# Patient Record
Sex: FEMALE | URBAN - METROPOLITAN AREA
[De-identification: names, ages, dates, MRNs, and addresses within clinical notes are randomized per-mention and may not be internally consistent; named-entity substitution may affect disease eponyms.]

---

## 2023-07-06 ENCOUNTER — APPOINTMENT (OUTPATIENT)
Dept: URBAN - METROPOLITAN AREA CLINIC 305 | Age: 68
Setting detail: DERMATOLOGY
End: 2023-07-06

## 2023-07-06 PROBLEM — C44.319 BASAL CELL CARCINOMA OF SKIN OF OTHER PARTS OF FACE: Status: ACTIVE | Noted: 2023-07-06

## 2023-07-06 PROCEDURE — OTHER ADDITIONAL NOTES: OTHER

## 2023-07-06 PROCEDURE — 99214 OFFICE O/P EST MOD 30 MIN: CPT

## 2023-07-06 NOTE — PROCEDURE: ADDITIONAL NOTES
Patient Management Risk Assessment: Moderate
Render Risk Assessment In Note?: no
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 Two biopsies were performed however, I feel that this will likely coalesce into one large lesion including removal of the old skin graft. Given that this will be a potentially large, multi subunit defect, I have recommended Mohs surgery for the excision and coordination with plastic surgery for reconstruction.\\n\\nWe will plan to also treat the right cheek at the same visit. However, I would anticipate a simple primary closure for this defect.
Detail Level: Simple

## 2023-07-06 NOTE — HPI: MOHS SURGERY CONSULTATION
Has The Cancer Been Biopsied Before?: has been previously biopsied
Who Is Your Referring Provider?: Pippa Randall
When Was Your Biopsy?: 6/13/23

## 2023-09-14 ENCOUNTER — APPOINTMENT (OUTPATIENT)
Dept: URBAN - METROPOLITAN AREA CLINIC 305 | Age: 68
Setting detail: DERMATOLOGY
End: 2023-09-14

## 2023-09-14 PROBLEM — C44.311 BASAL CELL CARCINOMA OF SKIN OF NOSE: Status: ACTIVE | Noted: 2023-09-14

## 2023-09-14 PROBLEM — C44.319 BASAL CELL CARCINOMA OF SKIN OF OTHER PARTS OF FACE: Status: ACTIVE | Noted: 2023-09-14

## 2023-09-14 PROCEDURE — 17311 MOHS 1 STAGE H/N/HF/G: CPT

## 2023-09-14 PROCEDURE — 17311 MOHS 1 STAGE H/N/HF/G: CPT | Mod: 76

## 2023-09-14 PROCEDURE — 17312 MOHS ADDL STAGE: CPT

## 2023-09-14 PROCEDURE — OTHER MOHS SURGERY: OTHER

## 2023-09-14 NOTE — HPI: SKIN LESION (BASAL CELL CARCINOMA)
Is This A New Presentation, Or A Follow-Up?: Basal Cell Carcinoma
Location From Outside Provider (Will Override Previously Chosen Location): Left [place]
Location From Outside Provider (Will Override Previously Chosen Location): Left cheek

## 2023-09-14 NOTE — PROCEDURE: MOHS SURGERY
Body Location Override (Optional - Billing Will Still Be Based On Selected Body Map Location If Applicable): left cheek (also ala)